# Patient Record
Sex: FEMALE | Race: WHITE | Employment: UNEMPLOYED | URBAN - METROPOLITAN AREA
[De-identification: names, ages, dates, MRNs, and addresses within clinical notes are randomized per-mention and may not be internally consistent; named-entity substitution may affect disease eponyms.]

---

## 2019-02-06 ENCOUNTER — APPOINTMENT (OUTPATIENT)
Dept: GENERAL RADIOLOGY | Age: 24
End: 2019-02-06
Payer: COMMERCIAL

## 2019-02-06 ENCOUNTER — HOSPITAL ENCOUNTER (EMERGENCY)
Age: 24
Discharge: HOME | End: 2019-02-06
Attending: EMERGENCY MEDICINE
Payer: COMMERCIAL

## 2019-02-06 VITALS
HEART RATE: 70 BPM | WEIGHT: 120 LBS | DIASTOLIC BLOOD PRESSURE: 77 MMHG | SYSTOLIC BLOOD PRESSURE: 116 MMHG | RESPIRATION RATE: 16 BRPM | OXYGEN SATURATION: 97 % | HEIGHT: 59 IN | TEMPERATURE: 98.4 F | BODY MASS INDEX: 24.19 KG/M2

## 2019-02-06 DIAGNOSIS — I30.0 IDIOPATHIC PERICARDITIS, UNSPECIFIED CHRONICITY: Primary | ICD-10-CM

## 2019-02-06 LAB
A/G RATIO: 1.4 (ref 1.1–2.2)
ALBUMIN SERPL-MCNC: 4.6 G/DL (ref 3.4–5)
ALP BLD-CCNC: 86 U/L (ref 40–129)
ALT SERPL-CCNC: 10 U/L (ref 10–40)
ANION GAP SERPL CALCULATED.3IONS-SCNC: 14 MMOL/L (ref 3–16)
APTT: 30.6 SEC (ref 26–36)
AST SERPL-CCNC: 13 U/L (ref 15–37)
BACTERIA: ABNORMAL /HPF
BASOPHILS ABSOLUTE: 0.1 K/UL (ref 0–0.2)
BASOPHILS RELATIVE PERCENT: 1.3 %
BILIRUB SERPL-MCNC: <0.2 MG/DL (ref 0–1)
BILIRUBIN URINE: NEGATIVE
BLOOD, URINE: ABNORMAL
BUN BLDV-MCNC: 21 MG/DL (ref 7–20)
CALCIUM SERPL-MCNC: 10.2 MG/DL (ref 8.3–10.6)
CHLORIDE BLD-SCNC: 100 MMOL/L (ref 99–110)
CLARITY: CLEAR
CO2: 23 MMOL/L (ref 21–32)
COLOR: YELLOW
CREAT SERPL-MCNC: 0.8 MG/DL (ref 0.6–1.1)
EKG ATRIAL RATE: 56 BPM
EKG DIAGNOSIS: NORMAL
EKG P AXIS: 46 DEGREES
EKG P-R INTERVAL: 112 MS
EKG Q-T INTERVAL: 382 MS
EKG QRS DURATION: 86 MS
EKG QTC CALCULATION (BAZETT): 368 MS
EKG R AXIS: 71 DEGREES
EKG T AXIS: 64 DEGREES
EKG VENTRICULAR RATE: 56 BPM
EOSINOPHILS ABSOLUTE: 0.2 K/UL (ref 0–0.6)
EOSINOPHILS RELATIVE PERCENT: 2.2 %
EPITHELIAL CELLS, UA: ABNORMAL /HPF
GFR AFRICAN AMERICAN: >60
GFR NON-AFRICAN AMERICAN: >60
GLOBULIN: 3.4 G/DL
GLUCOSE BLD-MCNC: 90 MG/DL (ref 70–99)
GLUCOSE URINE: NEGATIVE MG/DL
HCT VFR BLD CALC: 45.2 % (ref 36–48)
HEMOGLOBIN: 15 G/DL (ref 12–16)
INR BLD: 0.98 (ref 0.86–1.14)
KETONES, URINE: NEGATIVE MG/DL
LEUKOCYTE ESTERASE, URINE: NEGATIVE
LYMPHOCYTES ABSOLUTE: 2.1 K/UL (ref 1–5.1)
LYMPHOCYTES RELATIVE PERCENT: 24.2 %
MAGNESIUM: 1.9 MG/DL (ref 1.8–2.4)
MCH RBC QN AUTO: 30.1 PG (ref 26–34)
MCHC RBC AUTO-ENTMCNC: 33.2 G/DL (ref 31–36)
MCV RBC AUTO: 90.8 FL (ref 80–100)
MICROSCOPIC EXAMINATION: YES
MONOCYTES ABSOLUTE: 0.7 K/UL (ref 0–1.3)
MONOCYTES RELATIVE PERCENT: 8 %
NEUTROPHILS ABSOLUTE: 5.7 K/UL (ref 1.7–7.7)
NEUTROPHILS RELATIVE PERCENT: 64.3 %
NITRITE, URINE: NEGATIVE
PDW BLD-RTO: 14.5 % (ref 12.4–15.4)
PH UA: 7
PLATELET # BLD: 331 K/UL (ref 135–450)
PMV BLD AUTO: 7.2 FL (ref 5–10.5)
POTASSIUM SERPL-SCNC: 4.5 MMOL/L (ref 3.5–5.1)
PRO-BNP: <5 PG/ML (ref 0–124)
PROTEIN UA: NEGATIVE MG/DL
PROTHROMBIN TIME: 11.2 SEC (ref 9.8–13)
RBC # BLD: 4.98 M/UL (ref 4–5.2)
RBC UA: ABNORMAL /HPF (ref 0–2)
SEDIMENTATION RATE, ERYTHROCYTE: 10 MM/HR (ref 0–20)
SODIUM BLD-SCNC: 137 MMOL/L (ref 136–145)
SPECIFIC GRAVITY UA: 1.01
TOTAL PROTEIN: 8 G/DL (ref 6.4–8.2)
TROPONIN: <0.01 NG/ML
URINE REFLEX TO CULTURE: ABNORMAL
URINE TYPE: ABNORMAL
UROBILINOGEN, URINE: 0.2 E.U./DL
WBC # BLD: 8.8 K/UL (ref 4–11)
WBC UA: ABNORMAL /HPF (ref 0–5)

## 2019-02-06 PROCEDURE — 84484 ASSAY OF TROPONIN QUANT: CPT

## 2019-02-06 PROCEDURE — 99285 EMERGENCY DEPT VISIT HI MDM: CPT

## 2019-02-06 PROCEDURE — 81001 URINALYSIS AUTO W/SCOPE: CPT

## 2019-02-06 PROCEDURE — 93010 ELECTROCARDIOGRAM REPORT: CPT | Performed by: INTERNAL MEDICINE

## 2019-02-06 PROCEDURE — 83735 ASSAY OF MAGNESIUM: CPT

## 2019-02-06 PROCEDURE — 85730 THROMBOPLASTIN TIME PARTIAL: CPT

## 2019-02-06 PROCEDURE — 85610 PROTHROMBIN TIME: CPT

## 2019-02-06 PROCEDURE — 71045 X-RAY EXAM CHEST 1 VIEW: CPT

## 2019-02-06 PROCEDURE — 83880 ASSAY OF NATRIURETIC PEPTIDE: CPT

## 2019-02-06 PROCEDURE — 93005 ELECTROCARDIOGRAM TRACING: CPT | Performed by: PHYSICIAN ASSISTANT

## 2019-02-06 PROCEDURE — 80053 COMPREHEN METABOLIC PANEL: CPT

## 2019-02-06 PROCEDURE — 85652 RBC SED RATE AUTOMATED: CPT

## 2019-02-06 PROCEDURE — 85025 COMPLETE CBC W/AUTO DIFF WBC: CPT

## 2019-02-06 RX ORDER — ARIPIPRAZOLE 10 MG/1
10 TABLET ORAL DAILY
COMMUNITY
End: 2021-04-11 | Stop reason: ALTCHOICE

## 2019-02-06 RX ORDER — INDOMETHACIN 50 MG/1
50 CAPSULE ORAL
Qty: 21 CAPSULE | Refills: 0 | Status: SHIPPED | OUTPATIENT
Start: 2019-02-06 | End: 2021-04-11

## 2019-02-06 RX ORDER — LAMOTRIGINE 150 MG/1
50 TABLET ORAL DAILY
COMMUNITY
End: 2021-04-11 | Stop reason: ALTCHOICE

## 2019-02-06 ASSESSMENT — PAIN SCALES - GENERAL: PAINLEVEL_OUTOF10: 6

## 2019-02-06 ASSESSMENT — PAIN DESCRIPTION - LOCATION: LOCATION: CHEST

## 2019-02-06 ASSESSMENT — PAIN DESCRIPTION - FREQUENCY: FREQUENCY: CONTINUOUS

## 2019-02-06 ASSESSMENT — PAIN DESCRIPTION - ORIENTATION: ORIENTATION: LEFT;UPPER

## 2019-02-06 ASSESSMENT — PAIN DESCRIPTION - DESCRIPTORS: DESCRIPTORS: SHARP;TIGHTNESS

## 2019-02-06 ASSESSMENT — PAIN DESCRIPTION - PAIN TYPE: TYPE: ACUTE PAIN

## 2019-02-06 ASSESSMENT — PAIN DESCRIPTION - PROGRESSION: CLINICAL_PROGRESSION: GRADUALLY WORSENING

## 2021-02-06 ENCOUNTER — APPOINTMENT (OUTPATIENT)
Dept: GENERAL RADIOLOGY | Age: 26
End: 2021-02-06
Payer: COMMERCIAL

## 2021-02-06 ENCOUNTER — HOSPITAL ENCOUNTER (EMERGENCY)
Age: 26
Discharge: HOME | End: 2021-02-06
Attending: EMERGENCY MEDICINE
Payer: COMMERCIAL

## 2021-02-06 VITALS
DIASTOLIC BLOOD PRESSURE: 72 MMHG | OXYGEN SATURATION: 100 % | HEART RATE: 62 BPM | RESPIRATION RATE: 18 BRPM | SYSTOLIC BLOOD PRESSURE: 129 MMHG | HEIGHT: 59 IN | BODY MASS INDEX: 27.21 KG/M2 | WEIGHT: 135 LBS | TEMPERATURE: 98.7 F

## 2021-02-06 DIAGNOSIS — R42 LIGHTHEADEDNESS: ICD-10-CM

## 2021-02-06 DIAGNOSIS — R07.9 CHEST PAIN, UNSPECIFIED TYPE: Primary | ICD-10-CM

## 2021-02-06 DIAGNOSIS — R06.02 SHORTNESS OF BREATH: ICD-10-CM

## 2021-02-06 LAB
A/G RATIO: 1.3 (ref 1.1–2.2)
ALBUMIN SERPL-MCNC: 4.5 G/DL (ref 3.4–5)
ALP BLD-CCNC: 103 U/L (ref 40–129)
ALT SERPL-CCNC: 15 U/L (ref 10–40)
ANION GAP SERPL CALCULATED.3IONS-SCNC: 11 MMOL/L (ref 3–16)
AST SERPL-CCNC: 13 U/L (ref 15–37)
BASOPHILS ABSOLUTE: 0.1 K/UL (ref 0–0.2)
BASOPHILS RELATIVE PERCENT: 0.9 %
BILIRUB SERPL-MCNC: <0.2 MG/DL (ref 0–1)
BUN BLDV-MCNC: 15 MG/DL (ref 7–20)
CALCIUM SERPL-MCNC: 10.9 MG/DL (ref 8.3–10.6)
CHLORIDE BLD-SCNC: 104 MMOL/L (ref 99–110)
CO2: 26 MMOL/L (ref 21–32)
CREAT SERPL-MCNC: 0.9 MG/DL (ref 0.6–1.1)
D DIMER: <200 NG/ML DDU (ref 0–229)
EOSINOPHILS ABSOLUTE: 0.2 K/UL (ref 0–0.6)
EOSINOPHILS RELATIVE PERCENT: 2 %
GFR AFRICAN AMERICAN: >60
GFR NON-AFRICAN AMERICAN: >60
GLOBULIN: 3.5 G/DL
GLUCOSE BLD-MCNC: 120 MG/DL (ref 70–99)
HCG QUALITATIVE: NEGATIVE
HCT VFR BLD CALC: 42.1 % (ref 36–48)
HEMOGLOBIN: 14.1 G/DL (ref 12–16)
LYMPHOCYTES ABSOLUTE: 2.4 K/UL (ref 1–5.1)
LYMPHOCYTES RELATIVE PERCENT: 20.8 %
MAGNESIUM: 1.9 MG/DL (ref 1.8–2.4)
MCH RBC QN AUTO: 30.1 PG (ref 26–34)
MCHC RBC AUTO-ENTMCNC: 33.5 G/DL (ref 31–36)
MCV RBC AUTO: 89.9 FL (ref 80–100)
MONOCYTES ABSOLUTE: 0.9 K/UL (ref 0–1.3)
MONOCYTES RELATIVE PERCENT: 7.7 %
NEUTROPHILS ABSOLUTE: 7.9 K/UL (ref 1.7–7.7)
NEUTROPHILS RELATIVE PERCENT: 68.6 %
PDW BLD-RTO: 13.8 % (ref 12.4–15.4)
PLATELET # BLD: 369 K/UL (ref 135–450)
PMV BLD AUTO: 7.3 FL (ref 5–10.5)
POTASSIUM REFLEX MAGNESIUM: 3.5 MMOL/L (ref 3.5–5.1)
PRO-BNP: <5 PG/ML (ref 0–124)
RBC # BLD: 4.68 M/UL (ref 4–5.2)
SODIUM BLD-SCNC: 141 MMOL/L (ref 136–145)
TOTAL PROTEIN: 8 G/DL (ref 6.4–8.2)
TROPONIN: <0.01 NG/ML
WBC # BLD: 11.5 K/UL (ref 4–11)

## 2021-02-06 PROCEDURE — 83735 ASSAY OF MAGNESIUM: CPT

## 2021-02-06 PROCEDURE — 83880 ASSAY OF NATRIURETIC PEPTIDE: CPT

## 2021-02-06 PROCEDURE — 71045 X-RAY EXAM CHEST 1 VIEW: CPT

## 2021-02-06 PROCEDURE — 99283 EMERGENCY DEPT VISIT LOW MDM: CPT

## 2021-02-06 PROCEDURE — 36415 COLL VENOUS BLD VENIPUNCTURE: CPT

## 2021-02-06 PROCEDURE — 2500000003 HC RX 250 WO HCPCS: Performed by: EMERGENCY MEDICINE

## 2021-02-06 PROCEDURE — 84703 CHORIONIC GONADOTROPIN ASSAY: CPT

## 2021-02-06 PROCEDURE — 80053 COMPREHEN METABOLIC PANEL: CPT

## 2021-02-06 PROCEDURE — 96374 THER/PROPH/DIAG INJ IV PUSH: CPT

## 2021-02-06 PROCEDURE — 85025 COMPLETE CBC W/AUTO DIFF WBC: CPT

## 2021-02-06 PROCEDURE — 84484 ASSAY OF TROPONIN QUANT: CPT

## 2021-02-06 PROCEDURE — 93005 ELECTROCARDIOGRAM TRACING: CPT | Performed by: EMERGENCY MEDICINE

## 2021-02-06 PROCEDURE — 2580000003 HC RX 258: Performed by: EMERGENCY MEDICINE

## 2021-02-06 PROCEDURE — 85379 FIBRIN DEGRADATION QUANT: CPT

## 2021-02-06 RX ORDER — 0.9 % SODIUM CHLORIDE 0.9 %
1000 INTRAVENOUS SOLUTION INTRAVENOUS ONCE
Status: COMPLETED | OUTPATIENT
Start: 2021-02-06 | End: 2021-02-06

## 2021-02-06 RX ADMIN — FAMOTIDINE 20 MG: 10 INJECTION, SOLUTION INTRAVENOUS at 22:48

## 2021-02-06 RX ADMIN — SODIUM CHLORIDE 1000 ML: 9 INJECTION, SOLUTION INTRAVENOUS at 22:48

## 2021-02-06 ASSESSMENT — ENCOUNTER SYMPTOMS
BACK PAIN: 0
NAUSEA: 0
CONSTIPATION: 0
ABDOMINAL PAIN: 0
COUGH: 0
VOMITING: 0
DIARRHEA: 0
SHORTNESS OF BREATH: 1
SORE THROAT: 0

## 2021-02-07 LAB
EKG ATRIAL RATE: 94 BPM
EKG DIAGNOSIS: NORMAL
EKG P AXIS: 55 DEGREES
EKG P-R INTERVAL: 112 MS
EKG Q-T INTERVAL: 336 MS
EKG QRS DURATION: 82 MS
EKG QTC CALCULATION (BAZETT): 420 MS
EKG R AXIS: 71 DEGREES
EKG T AXIS: 17 DEGREES
EKG VENTRICULAR RATE: 94 BPM

## 2021-02-07 PROCEDURE — 93010 ELECTROCARDIOGRAM REPORT: CPT | Performed by: INTERNAL MEDICINE

## 2021-02-07 NOTE — ED PROVIDER NOTES
[facility]      Pt Name: Mde Armas  MRN: [id number]  [place] 1995  Date of evaluation: 2/6/2021  Provider: You Cortes MD    [address] [place] [address]       Chief Complaint   Patient presents with    Chest Pain     pt states L sided chest pain since noon, radiating towards back, cardiac hx         HISTORY OF PRESENT ILLNESS   (Location/Symptom, Timing/Onset, Context/Setting, Quality, Duration, Modifying Factors, Severity)  Note limiting factors. Med Armas is a 22 y.o. female who presents to the emergency department     Is a 80-year-old female transitioning to male previously healthy who presents with chest pain. Patient reports that pain started shortly after waking up this morning when he was not doing anything particular. States that it is a sharp pain that also feels achy that he rates as a 6 out of 10 on the pain scale. This radiates through to his back. Tried taking Tylenol about an hour ago with minimal relief. Reports significant indigestion and frequent burping but denies any nausea or vomiting. Reports that this feels similar to when he has had pericarditis and myopericarditis in the past was concerned that may be the case so came in for evaluation. Does report some lightheadedness and shortness of breath. Patient is taking testosterone currently. The history is provided by the patient. Nursing Notes were reviewed. REVIEW OF SYSTEMS    (2-9 systems for level 4, 10 or more for level 5)     Review of Systems   Constitutional: Negative for chills and fever. HENT: Negative for congestion and sore throat. Eyes: Negative for visual disturbance. Respiratory: Positive for shortness of breath. Negative for cough. Cardiovascular: Positive for chest pain. Negative for leg swelling. Gastrointestinal: Negative for abdominal pain, constipation, diarrhea, nausea and vomiting.    Genitourinary: Negative for dysuria and hematuria. Musculoskeletal: Negative for back pain and neck pain. Skin: Negative for pallor and rash. Neurological: Positive for light-headedness. Negative for headaches. All other systems reviewed and are negative. Except as noted above the remainder of the review of systems was reviewed and negative. PAST MEDICAL HISTORY     Past Medical History:   Diagnosis Date    Kidney stone          SURGICAL HISTORY     History reviewed. No pertinent surgical history. CURRENT MEDICATIONS       Previous Medications    ARIPIPRAZOLE (ABILIFY) 10 MG TABLET    Take 10 mg by mouth daily    INDOMETHACIN (INDOCIN) 50 MG CAPSULE    Take 1 capsule by mouth 3 times daily (with meals) for 7 days    LAMOTRIGINE (LAMICTAL) 150 MG TABLET    Take 50 mg by mouth daily    TESTOSTERONE CYPIONATE IM    Inject into the muscle    VORTIOXETINE HBR (TRINTELLIX) 20 MG TABS TABLET    Take 10 mg by mouth daily       ALLERGIES     Patient has no known allergies. FAMILY HISTORY     History reviewed. No pertinent family history.        SOCIAL HISTORY       Social History     Socioeconomic History    Marital status: Single     Spouse name: None    Number of children: None    Years of education: None    Highest education level: None   Occupational History    None   Social Needs    Financial resource strain: None    Food insecurity     Worry: None     Inability: None    Transportation needs     Medical: None     Non-medical: None   Tobacco Use    Smoking status: Current Every Day Smoker     Packs/day: 0.25     Types: Cigarettes    Smokeless tobacco: Never Used   Substance and Sexual Activity    Alcohol use: Yes     Comment: socially    Drug use: Yes     Types: Marijuana     Comment: daily    Sexual activity: None   Lifestyle    Physical activity     Days per week: None     Minutes per session: None    Stress: None   Relationships    Social connections     Talks on phone: None     Gets together: None     Attends Restorationism service: None     Active member of club or organization: None     Attends meetings of clubs or organizations: None     Relationship status: None    Intimate partner violence     Fear of current or ex partner: None     Emotionally abused: None     Physically abused: None     Forced sexual activity: None   Other Topics Concern    None   Social History Narrative    None       SCREENINGS    [place]ont Coma Scale  Eye Opening: Spontaneous  Best Verbal Response: Oriented  Best Motor Response: Obeys commands  Dumont Coma Scale Score: 15          PHYSICAL EXAM    (up to 7 for level 4, 8 or more for level 5)     ED Triage Vitals   BP Temp Temp src Pulse Resp SpO2 Height Weight   -- -- -- -- -- -- -- --       Physical Exam  Vitals signs and nursing note reviewed. Constitutional:       General: She is not in acute distress. Appearance: Normal appearance. HENT:      Head: Normocephalic and atraumatic. Nose: Nose normal. No congestion. Mouth/Throat:      Mouth: Mucous membranes are moist.   Eyes:      Conjunctiva/sclera: Conjunctivae normal.   Neck:      Musculoskeletal: Normal range of motion and neck supple. Cardiovascular:      Rate and Rhythm: Normal rate and regular rhythm. Pulses: Normal pulses. Heart sounds: Normal heart sounds. No murmur. Pulmonary:      Effort: Pulmonary effort is normal. No respiratory distress. Breath sounds: Normal breath sounds. Abdominal:      General: There is no distension. Palpations: Abdomen is soft. Tenderness: There is no abdominal tenderness. Musculoskeletal: Normal range of motion. General: No swelling or deformity. Skin:     General: Skin is warm and dry. Neurological:      General: No focal deficit present. Mental Status: She is alert and oriented to person, place, and time.          DIAGNOSTIC RESULTS     EKG: All EKG's are interpreted by the Emergency Department Physician who either signs or Co-signs this chart in the absence of a cardiologist.    Normal sinus rhythm, rate 94, normal intervals, no STEMI    RADIOLOGY:     Interpretation per the Radiologist below, if available at the time of this note:    XR CHEST PORTABLE   Final Result   1. No acute abnormality. LABS:  Labs Reviewed   CBC WITH AUTO DIFFERENTIAL - Abnormal; Notable for the following components:       Result Value    WBC 11.5 (*)     Neutrophils Absolute 7.9 (*)     All other components within normal limits    Narrative:     Performed at:  [facility]. [facility]  [facility]  [employer] [facility]. [employer]   Phone [phone]   COMPREHENSIVE METABOLIC PANEL W/ REFLEX TO MG FOR LOW K - Abnormal; Notable for the following components:    Glucose 120 (*)     Calcium 10.9 (*)     AST 13 (*)     All other components within normal limits    Narrative:     Performed at:  [facility]. [facility]  [address] [place] [address]  [employer] [facility]. [employer]   Phone [phone]   TROPONIN    Narrative:     Performed at:  [facility]. [facility]  [address] [place] [address]  [employer] [address]. [employer]   Phone [phone]    Narrative:     Performed at:  [facility]. [facility]  [address] [place] [address]  [employer] [facility]. [employer]   Phone [phone]   D-DIMER, QUANTITATIVE    Narrative:     Performed at:  [facility]. [facility]  [address] [place] [address]  [employer] [facility]. [employer]   Phone [phone]   HCG, SERUM, QUALITATIVE    Narrative:     Performed at:  [facility]. [facility]  [address] [place], [place] [address]Exam18[facility]  [employer] [address]. [employer]   Phone [phone]   MAGNESIUM    Narrative:     Performed at:  [facility]. [facility]  [address] [place], [place] [address]Exam18[facility],  [employer] [address].  [employer]   Phone [phone]       All other labs were within normal range or not returned as of this dictation. EMERGENCY DEPARTMENT COURSE and DIFFERENTIAL DIAGNOSIS/MDM:   Vitals:    Vitals:    02/06/21 2213 02/06/21 2317   BP: (!) 149/88 117/78   Pulse: 105 70   Resp: 18 18   Temp: 98.7 °F (37.1 °C)    TempSrc: Oral    SpO2: 99% 100%   Weight: 135 lb (61.2 kg)    Height: 4' 11\" (1.499 m)        Patient evaluated and previous record reviewed. Patient presents with chest pain, lightheadedness, shortness of breath. Vital signs notable for initial tachycardia that improves, oxygen saturation 100% on room air. Physical exam as documented above. EKG without ST elevation. Lab work-up notable for troponin negative, BNP negative, D-dimer negative. Does not appear the patient is currently having an occurrence of pericarditis or myopericarditis due to negative blood work and normal EKG. Can rule out blood clots with negative D-dimer. No evidence of heart failure or intra-abdominal pathology causing referred pain. Patient given IV fluids and Pepcid without reported improvement in symptoms. Patient did take Tylenol just prior to arrival.  Unclear etiology of patient's pain at this time though could be viral infection, musculoskeletal pain, or reflux. Advised patient to provide supportive care at home and counseled on return precautions. Recommended follow-up with primary care provider and cardiologist.    CONSULTS:  None    PROCEDURES:  Unless otherwise noted below, none     Procedures      FINAL IMPRESSION      1. Chest pain, unspecified type    2. Lightheadedness    3. Shortness of breath          DISPOSITION/PLAN   DISPOSITION        PATIENT REFERRED TO:  Axel Amezquita  [address] [place] [address]  [phone]    Schedule an appointment as soon as possible for a visit         DISCHARGE MEDICATIONS:  New Prescriptions    No medications on file     Controlled Substances Monitoring:     No flowsheet data found.     (Please note that portions of this note were completed with a voice

## 2021-02-11 ENCOUNTER — TELEPHONE (OUTPATIENT)
Dept: EMERGENCY DEPT | Age: 26
End: 2021-02-11

## 2021-02-11 NOTE — TELEPHONE ENCOUNTER
The patient did not answer MSM attempt for a telephonic welness check. Attempted to reach patient twice, left a vm offering additional support and the PCP referral line.

## 2021-04-11 ENCOUNTER — HOSPITAL ENCOUNTER (EMERGENCY)
Age: 26
Discharge: HOME | End: 2021-04-11
Attending: EMERGENCY MEDICINE
Payer: COMMERCIAL

## 2021-04-11 ENCOUNTER — APPOINTMENT (OUTPATIENT)
Dept: CT IMAGING | Age: 26
End: 2021-04-11
Payer: COMMERCIAL

## 2021-04-11 VITALS
DIASTOLIC BLOOD PRESSURE: 68 MMHG | TEMPERATURE: 98.6 F | RESPIRATION RATE: 18 BRPM | BODY MASS INDEX: 27.21 KG/M2 | WEIGHT: 135 LBS | HEART RATE: 81 BPM | OXYGEN SATURATION: 99 % | SYSTOLIC BLOOD PRESSURE: 112 MMHG | HEIGHT: 59 IN

## 2021-04-11 DIAGNOSIS — R10.9 RIGHT FLANK PAIN: ICD-10-CM

## 2021-04-11 DIAGNOSIS — N23 RENAL COLIC: ICD-10-CM

## 2021-04-11 DIAGNOSIS — N20.1 RIGHT URETERAL STONE: Primary | ICD-10-CM

## 2021-04-11 LAB
A/G RATIO: 1.6 (ref 1.1–2.2)
ALBUMIN SERPL-MCNC: 4.7 G/DL (ref 3.4–5)
ALP BLD-CCNC: 105 U/L (ref 40–129)
ALT SERPL-CCNC: 10 U/L (ref 10–40)
ANION GAP SERPL CALCULATED.3IONS-SCNC: 13 MMOL/L (ref 3–16)
AST SERPL-CCNC: 15 U/L (ref 15–37)
BACTERIA: ABNORMAL /HPF
BASOPHILS ABSOLUTE: 0.1 K/UL (ref 0–0.2)
BASOPHILS RELATIVE PERCENT: 0.6 %
BILIRUB SERPL-MCNC: 0.4 MG/DL (ref 0–1)
BILIRUBIN URINE: NEGATIVE
BLOOD, URINE: ABNORMAL
BUN BLDV-MCNC: 17 MG/DL (ref 7–20)
CALCIUM SERPL-MCNC: 10.3 MG/DL (ref 8.3–10.6)
CHLORIDE BLD-SCNC: 103 MMOL/L (ref 99–110)
CLARITY: ABNORMAL
CO2: 22 MMOL/L (ref 21–32)
COLOR: YELLOW
CREAT SERPL-MCNC: 0.8 MG/DL (ref 0.6–1.1)
EOSINOPHILS ABSOLUTE: 0.2 K/UL (ref 0–0.6)
EOSINOPHILS RELATIVE PERCENT: 1.6 %
EPITHELIAL CELLS, UA: ABNORMAL /HPF (ref 0–5)
GFR AFRICAN AMERICAN: >60
GFR NON-AFRICAN AMERICAN: >60
GLOBULIN: 3 G/DL
GLUCOSE BLD-MCNC: 86 MG/DL (ref 70–99)
GLUCOSE URINE: NEGATIVE MG/DL
HCT VFR BLD CALC: 42.3 % (ref 36–48)
HEMOGLOBIN: 14.1 G/DL (ref 12–16)
KETONES, URINE: >=80 MG/DL
LEUKOCYTE ESTERASE, URINE: NEGATIVE
LYMPHOCYTES ABSOLUTE: 1.9 K/UL (ref 1–5.1)
LYMPHOCYTES RELATIVE PERCENT: 17 %
MCH RBC QN AUTO: 30.5 PG (ref 26–34)
MCHC RBC AUTO-ENTMCNC: 33.2 G/DL (ref 31–36)
MCV RBC AUTO: 91.8 FL (ref 80–100)
MICROSCOPIC EXAMINATION: YES
MONOCYTES ABSOLUTE: 0.7 K/UL (ref 0–1.3)
MONOCYTES RELATIVE PERCENT: 6.6 %
NEUTROPHILS ABSOLUTE: 8.4 K/UL (ref 1.7–7.7)
NEUTROPHILS RELATIVE PERCENT: 74.2 %
NITRITE, URINE: NEGATIVE
PDW BLD-RTO: 13.4 % (ref 12.4–15.4)
PH UA: 6 (ref 5–8)
PLATELET # BLD: 327 K/UL (ref 135–450)
PMV BLD AUTO: 7.3 FL (ref 5–10.5)
POTASSIUM REFLEX MAGNESIUM: 4.1 MMOL/L (ref 3.5–5.1)
PROTEIN UA: ABNORMAL MG/DL
RBC # BLD: 4.61 M/UL (ref 4–5.2)
RBC UA: >100 /HPF (ref 0–4)
SODIUM BLD-SCNC: 138 MMOL/L (ref 136–145)
SPECIFIC GRAVITY UA: 1.02 (ref 1–1.03)
TOTAL PROTEIN: 7.7 G/DL (ref 6.4–8.2)
URINE TYPE: ABNORMAL
UROBILINOGEN, URINE: 0.2 E.U./DL
WBC # BLD: 11.3 K/UL (ref 4–11)
WBC UA: ABNORMAL /HPF (ref 0–5)

## 2021-04-11 PROCEDURE — 6360000002 HC RX W HCPCS: Performed by: EMERGENCY MEDICINE

## 2021-04-11 PROCEDURE — 96375 TX/PRO/DX INJ NEW DRUG ADDON: CPT

## 2021-04-11 PROCEDURE — 80053 COMPREHEN METABOLIC PANEL: CPT

## 2021-04-11 PROCEDURE — 81001 URINALYSIS AUTO W/SCOPE: CPT

## 2021-04-11 PROCEDURE — 85025 COMPLETE CBC W/AUTO DIFF WBC: CPT

## 2021-04-11 PROCEDURE — 96374 THER/PROPH/DIAG INJ IV PUSH: CPT

## 2021-04-11 PROCEDURE — 36415 COLL VENOUS BLD VENIPUNCTURE: CPT

## 2021-04-11 PROCEDURE — 74176 CT ABD & PELVIS W/O CONTRAST: CPT

## 2021-04-11 PROCEDURE — 99284 EMERGENCY DEPT VISIT MOD MDM: CPT

## 2021-04-11 PROCEDURE — 6370000000 HC RX 637 (ALT 250 FOR IP): Performed by: EMERGENCY MEDICINE

## 2021-04-11 PROCEDURE — 2580000003 HC RX 258: Performed by: EMERGENCY MEDICINE

## 2021-04-11 RX ORDER — OXYCODONE HYDROCHLORIDE AND ACETAMINOPHEN 5; 325 MG/1; MG/1
1 TABLET ORAL EVERY 6 HOURS PRN
Qty: 12 TABLET | Refills: 0 | Status: SHIPPED | OUTPATIENT
Start: 2021-04-11 | End: 2021-04-18

## 2021-04-11 RX ORDER — KETOROLAC TROMETHAMINE 30 MG/ML
30 INJECTION, SOLUTION INTRAMUSCULAR; INTRAVENOUS ONCE
Status: COMPLETED | OUTPATIENT
Start: 2021-04-11 | End: 2021-04-11

## 2021-04-11 RX ORDER — TAMSULOSIN HYDROCHLORIDE 0.4 MG/1
0.4 CAPSULE ORAL DAILY
Status: DISCONTINUED | OUTPATIENT
Start: 2021-04-11 | End: 2021-04-11 | Stop reason: HOSPADM

## 2021-04-11 RX ORDER — OXYCODONE HYDROCHLORIDE AND ACETAMINOPHEN 5; 325 MG/1; MG/1
1 TABLET ORAL ONCE
Status: COMPLETED | OUTPATIENT
Start: 2021-04-11 | End: 2021-04-11

## 2021-04-11 RX ORDER — 0.9 % SODIUM CHLORIDE 0.9 %
1000 INTRAVENOUS SOLUTION INTRAVENOUS ONCE
Status: COMPLETED | OUTPATIENT
Start: 2021-04-11 | End: 2021-04-11

## 2021-04-11 RX ORDER — ONDANSETRON 2 MG/ML
4 INJECTION INTRAMUSCULAR; INTRAVENOUS EVERY 30 MIN PRN
Status: DISCONTINUED | OUTPATIENT
Start: 2021-04-11 | End: 2021-04-11 | Stop reason: HOSPADM

## 2021-04-11 RX ORDER — MORPHINE SULFATE 4 MG/ML
4 INJECTION, SOLUTION INTRAMUSCULAR; INTRAVENOUS EVERY 30 MIN PRN
Status: DISCONTINUED | OUTPATIENT
Start: 2021-04-11 | End: 2021-04-11 | Stop reason: HOSPADM

## 2021-04-11 RX ORDER — ONDANSETRON 4 MG/1
4 TABLET, ORALLY DISINTEGRATING ORAL EVERY 8 HOURS PRN
Qty: 20 TABLET | Refills: 0 | Status: SHIPPED | OUTPATIENT
Start: 2021-04-11 | End: 2021-04-18

## 2021-04-11 RX ORDER — KETOROLAC TROMETHAMINE 10 MG/1
10 TABLET, FILM COATED ORAL EVERY 6 HOURS PRN
Qty: 20 TABLET | Refills: 0 | Status: SHIPPED | OUTPATIENT
Start: 2021-04-11 | End: 2021-04-16

## 2021-04-11 RX ORDER — TAMSULOSIN HYDROCHLORIDE 0.4 MG/1
0.4 CAPSULE ORAL DAILY
Qty: 5 CAPSULE | Refills: 0 | Status: SHIPPED | OUTPATIENT
Start: 2021-04-11 | End: 2021-04-16

## 2021-04-11 RX ADMIN — TAMSULOSIN HYDROCHLORIDE 0.4 MG: 0.4 CAPSULE ORAL at 20:21

## 2021-04-11 RX ADMIN — SODIUM CHLORIDE 1000 ML: 9 INJECTION, SOLUTION INTRAVENOUS at 19:02

## 2021-04-11 RX ADMIN — MORPHINE SULFATE 4 MG: 4 INJECTION INTRAVENOUS at 19:18

## 2021-04-11 RX ADMIN — KETOROLAC TROMETHAMINE 30 MG: 30 INJECTION, SOLUTION INTRAMUSCULAR at 19:02

## 2021-04-11 RX ADMIN — ONDANSETRON HYDROCHLORIDE 4 MG: 2 INJECTION, SOLUTION INTRAMUSCULAR; INTRAVENOUS at 19:02

## 2021-04-11 RX ADMIN — OXYCODONE HYDROCHLORIDE AND ACETAMINOPHEN 1 TABLET: 5; 325 TABLET ORAL at 20:21

## 2021-04-11 ASSESSMENT — PAIN SCALES - GENERAL
PAINLEVEL_OUTOF10: 8
PAINLEVEL_OUTOF10: 7
PAINLEVEL_OUTOF10: 8
PAINLEVEL_OUTOF10: 8

## 2021-04-11 NOTE — ED PROVIDER NOTES
Emergency Physician Note  [address] [address] [address]  Dept: [phone]  Loc: [phone]  Open Note Time:  6:40 PM EDT    Chief Complaint  Flank Pain (\"kidney stone pain\" started 3 days ago. Hx stones with lithotripsies x 5 and stent once. )       History of Present Illness  Meli Gaspar is a 22 y.o. female  has a past medical history of Kidney stone. who presents to the ED for right flank pain. Patient was born biologically a female transitioning to male, has had a bilateral oophorectomy and partial hysterectomy and taking testosterone. Patient prefers pronouns he/they. he states he started having kidney stone pain 3 days ago however it got worst today. He states that 1.5 month ago he was treated for a kidney infection and had complete resolution of all symptoms. Patient had nausea and vomiting today associated with the increase and right flank pain. The pain is sharp and radiates around the right lower quadrant. Most recent urological surgery was in 2019. Thinks he may have had fever today. Denies  chest pain, shortness of breath, cough, abdominal pain, nausea, vomiting, diarrhea, headache, sore throat, dysuria, back pain, rash. No palliative/provocative factors. Unless otherwise stated in this report or unable to obtain because of the patient's clinical or mental status as evidenced by the medical record, this patient's positive and negative responses for review of systems, constitutional, psych, eyes, ENT, cardiovascular, respiratory, gastrointestinal, neurological, genitourinary, musculoskeletal, integument systems and systems related to the presenting problem are either stated in the preceding paragraph or were not pertinent or were negative for the symptoms and/or complaints related to the medical problem.     I have reviewed the following from the nursing documentation:      Prior to Admission medications Medication Sig Start Date End Date Taking? Authorizing Provider   VORTIoxetine HBr (TRINTELLIX) 20 MG TABS tablet Take 10 mg by mouth daily    Historical Provider, MD   TESTOSTERONE CYPIONATE IM Inject into the muscle    Historical Provider, MD       Allergies as of 04/11/2021    (No Known Allergies)       Past Medical History:   Diagnosis Date    Kidney stone         Surgical History: History reviewed. No pertinent surgical history. Family History:  History reviewed. No pertinent family history.     Social History     Socioeconomic History    Marital status: Single     Spouse name: Not on file    Number of children: Not on file    Years of education: Not on file    Highest education level: Not on file   Occupational History    Not on file   Social Needs    Financial resource strain: Not on file    Food insecurity     Worry: Not on file     Inability: Not on file    Transportation needs     Medical: Not on file     Non-medical: Not on file   Tobacco Use    Smoking status: Current Every Day Smoker     Packs/day: 0.25     Types: Cigarettes    Smokeless tobacco: Never Used   Substance and Sexual Activity    Alcohol use: Yes     Comment: socially    Drug use: Yes     Types: Marijuana     Comment: daily    Sexual activity: Not on file   Lifestyle    Physical activity     Days per week: Not on file     Minutes per session: Not on file    Stress: Not on file   Relationships    Social connections     Talks on phone: Not on file     Gets together: Not on file     Attends Taoism service: Not on file     Active member of club or organization: Not on file     Attends meetings of clubs or organizations: Not on file     Relationship status: Not on file    Intimate partner violence     Fear of current or ex partner: Not on file     Emotionally abused: Not on file     Physically abused: Not on file     Forced sexual activity: Not on file   Other Topics Concern    Not on file   Social History Narrative  Not on file       Nursing notes reviewed. ED Triage Vitals [04/11/21 1834]   Enc Vitals Group      BP (!) 118/92      Pulse 85      Resp 18      Temp 98.6 °F (37 °C)      Temp Source Oral      SpO2 99 %      Weight 135 lb (61.2 kg)      Height 4' 11\" (1.499 m)      Head Circumference       Peak Flow       Pain Score       Pain Loc       Pain Edu? Excl. in [address]? GENERAL:   Body mass index is 27.27 kg/m². Awake, alert. Well developed, well nourished with apparent distress. Nontoxic-appearing, non-ill-appearing. HENT:   Normocephalic, Atraumatic, no lacerations. No ENT exam due to PPE. EYES:   Conjunctiva normal,   Pupils equal round and reactive to light,   Extraocular movements normal.  NECK:  Trachea is midline. No stridor. CHEST:  Regular rate and regular rhythm, no murmurs/rubs/gallops,  normal S1/S2, chest wall non-tender. LUNGS:  No respiratory distress. No abdominal retractions, no sternal retractions  Clear to auscultation bilaterally, no wheezing, no rhochi, no rales  Speaking comfortably in full sentences  ABDOMEN:  Soft, non-tender, non-distended. No guarding. No rebound. Mild right costovertebral angle tenderness to palpation. Normal BS, no organomegaly, no abdominal masses  EXTREMITIES:  Moves extremities x4 with purpose. Normal range of motion, no edema,  No tenderness, no deformity,  distal pulses present and equal bilaterally. BACK:  No midline tenderness in the cervical, thoracic, and lumbar spine. No deformities, no step-off. Palpation did not elicit any paraspinous tenderness. SKIN:  Warm, dry and intact. NEUROLOGIC:  Normal mental status. Moving all extremities to command. Alert and oriented x4  without focal motor deficit or gross sensory deficit. Normal speech.   PSYCHIATRIC:  Not anxious,  normal mood and affect,  Appropriate eye contact,  thoughts are linear and organized,  without delusions/hallucinations,  Not responding to internal stimuli,  responds appropriately to questions    LABS and DIAGNOSTIC RESULTS    RADIOLOGY  X-RAYS:  I have reviewed radiologic plain film image(s). ALL OTHER NON-PLAIN FILM IMAGES SUCH AS CT, ULTRASOUND AND MRI HAVE BEEN READ BY THE RADIOLOGIST. CT ABDOMEN PELVIS WO CONTRAST Additional Contrast? None   Final Result   Bilateral calculi including right-sided hydronephrosis caused by a 5 mm right   proximal to mid ureteral calculus.       2 calculi identified within the left ureteropelvic junction junction              LABS  Results for orders placed or performed during the hospital encounter of 04/11/21   Urinalysis, reflex to microscopic   Result Value Ref Range    Color, UA Yellow Straw/Yellow    Clarity, UA SL CLOUDY (A) Clear    Glucose, Ur Negative Negative mg/dL    Bilirubin Urine Negative Negative    Ketones, Urine >=80 (A) Negative mg/dL    Specific Gravity, UA 1.025 1.005 - 1.030    Blood, Urine LARGE (A) Negative    pH, UA 6.0 5.0 - 8.0    Protein, UA TRACE (A) Negative mg/dL    Urobilinogen, Urine 0.2 <2.0 E.U./dL    Nitrite, Urine Negative Negative    Leukocyte Esterase, Urine Negative Negative    Microscopic Examination YES     Urine Type NotGiven    CBC Auto Differential   Result Value Ref Range    WBC 11.3 (H) 4.0 - 11.0 K/uL    RBC 4.61 4.00 - 5.20 M/uL    Hemoglobin 14.1 12.0 - 16.0 g/dL    Hematocrit 42.3 36.0 - 48.0 %    MCV 91.8 80.0 - 100.0 fL    MCH 30.5 26.0 - 34.0 pg    MCHC 33.2 31.0 - 36.0 g/dL    RDW 13.4 12.4 - 15.4 %    Platelets 282 333 - 938 K/uL    MPV 7.3 5.0 - 10.5 fL    Neutrophils % 74.2 %    Lymphocytes % 17.0 %    Monocytes % 6.6 %    Eosinophils % 1.6 %    Basophils % 0.6 %    Neutrophils Absolute 8.4 (H) 1.7 - 7.7 K/uL    Lymphocytes Absolute 1.9 1.0 - 5.1 K/uL    Monocytes Absolute 0.7 0.0 - 1.3 K/uL    Eosinophils Absolute 0.2 0.0 - 0.6 K/uL    Basophils Absolute 0.1 0.0 - 0.2 K/uL   Comprehensive Metabolic Panel w/ Reflex to MG   Result Value Ref Range    Sodium 138 136 - 145 mmol/L Potassium reflex Magnesium 4.1 3.5 - 5.1 mmol/L    Chloride 103 99 - 110 mmol/L    CO2 22 21 - 32 mmol/L    Anion Gap 13 3 - 16    Glucose 86 70 - 99 mg/dL    BUN 17 7 - 20 mg/dL    CREATININE 0.8 0.6 - 1.1 mg/dL    GFR Non-African American >60 >60    GFR African American >60 >60    Calcium 10.3 8.3 - 10.6 mg/dL    Total Protein 7.7 6.4 - 8.2 g/dL    Albumin 4.7 3.4 - 5.0 g/dL    Albumin/Globulin Ratio 1.6 1.1 - 2.2    Total Bilirubin 0.4 0.0 - 1.0 mg/dL    Alkaline Phosphatase 105 40 - 129 U/L    ALT 10 10 - 40 U/L    AST 15 15 - 37 U/L    Globulin 3.0 g/dL   Microscopic Urinalysis   Result Value Ref Range    WBC, UA 3-5 0 - 5 /HPF    RBC, UA >100 (A) 0 - 4 /HPF    Epithelial Cells, UA 2-5 0 - 5 /HPF    Bacteria, UA 2+ (A) None Seen /HPF       SCREENINGS  NIH Score     [facility]     [facility]    Heart Score       PROCEDURES    MEDICAL DECISION MAKING    Procedures/interventions/images ordered for this visit  Orders Placed This Encounter   Procedures    Urinalysis, reflex to microscopic       Medications ordered for this visit  No orders of the defined types were placed in this encounter. ED course notes for this visit  ED Course as of Apr 11 2046   Sun Apr 11, 2021 2007 Patient reports feeling better however she feels as if the pain is increasing again. I discussed the results of the CT scan with him. I gave an option of staying here and trying to control the pain until he passes the stone to try to do a trial of passage at home. He chose to do a trial of passage at home. Patient states he is already established with Dr. Santo Stoll as the urologist.    [SG]      ED Course User Index  [SG] Ashia James MD       I wore surgical mask and gloves when I evaluated the patient. I evaluated the patient in room 09/09    Patient presents to ED for evaluation of acute flank pain. No history of direct trauma. Neurovascular exam in the ER is unremarkable for any deficits.   Vitals signs have been reviewed and tablet, R-0Print      ondansetron (ZOFRAN-ODT) 4 MG disintegrating tablet Place 1 tablet under the tongue every 8 hours as needed for Nausea or Vomiting May Sub regular tablet (non-ODT) if insurance does not cover ODT., Disp-20 tablet, R-0Print      tamsulosin (FLOMAX) 0.4 MG capsule Take 1 capsule by mouth daily for 5 days, Disp-5 capsule, R-0Print      oxyCODONE-acetaminophen (PERCOCET) 5-325 MG per tablet Take 1 tablet by mouth every 6 hours as needed for Pain for up to 7 days. , Disp-12 tablet, R-0Print             Patient had scripts modified or refilled for the following medications. I counseled patient how to take these medications. Discharge Medication List as of 4/11/2021  8:13 PM          Patient had scripts discontinues for the following medications. I counseled patient to stop taking these medications. Discharge Medication List as of 4/11/2021  8:13 PM      STOP taking these medications       indomethacin (INDOCIN) 50 MG capsule Comments:   Reason for Stopping:                 Disposition  At this point I do not feel the patient requires further work up and it is reasonable to discharge the patient. I had a discussion with the patient and/or their surrogate regarding diagnosis, diagnostic testing results, treatment/ plan of care, and follow up. Patient and/or companions verbalized understanding of the ED workup, any relevant findings as well as any incidental findings, and the disposition and plan. There was shared decision-making between myself as well as the patient and/or their surrogate and we are all in agreement with discharge home. Valeri Mcpherson was an opportunity for questions and all questions were answered to the best of my ability and to the satisfaction of the patient and/or patient's family. Patient agreed to follow up as recommend for further evaluation/treatment. The patient was given strict return precautions as we discussed symptoms that would necessitate return to the ED.  Patient will return to ED for new/worsening symptoms. The patient verbalized their understanding and agreement with the above plan. Please refer to AVS for further details regarding discharge instructions. Pt is in stable condition upon Discharge to home. The note was completed using Dragon voice recognition transcription. Every effort was made to ensure accuracy; however, inadvertent transcription errors may be present despite my best efforts to edit errors.     Donny Mendez MD  [facility], [place]  04/11/21 7326

## 2021-04-21 ENCOUNTER — HOSPITAL ENCOUNTER (OUTPATIENT)
Dept: ULTRASOUND IMAGING | Age: 26
Discharge: HOME | End: 2021-04-21
Payer: COMMERCIAL

## 2021-04-21 DIAGNOSIS — R35.0 FREQUENCY OF MICTURITION: ICD-10-CM

## 2021-04-21 PROCEDURE — 76770 US EXAM ABDO BACK WALL COMP: CPT
